# Patient Record
Sex: FEMALE | Race: OTHER | Employment: FULL TIME | URBAN - METROPOLITAN AREA
[De-identification: names, ages, dates, MRNs, and addresses within clinical notes are randomized per-mention and may not be internally consistent; named-entity substitution may affect disease eponyms.]

---

## 2017-04-01 ENCOUNTER — OFFICE VISIT (OUTPATIENT)
Dept: FAMILY MEDICINE CLINIC | Facility: CLINIC | Age: 22
End: 2017-04-01

## 2017-04-01 VITALS
OXYGEN SATURATION: 99 % | HEART RATE: 90 BPM | TEMPERATURE: 98 F | DIASTOLIC BLOOD PRESSURE: 74 MMHG | SYSTOLIC BLOOD PRESSURE: 116 MMHG | RESPIRATION RATE: 16 BRPM

## 2017-04-01 DIAGNOSIS — R21 RASH: Primary | ICD-10-CM

## 2017-04-01 PROCEDURE — 99212 OFFICE O/P EST SF 10 MIN: CPT | Performed by: NURSE PRACTITIONER

## 2017-04-01 RX ORDER — PREDNISONE 50 MG/1
TABLET ORAL
Refills: 0 | COMMUNITY
Start: 2017-03-20 | End: 2017-04-01 | Stop reason: ALTCHOICE

## 2017-04-01 RX ORDER — METRONIDAZOLE 500 MG/1
TABLET ORAL
Refills: 0 | COMMUNITY
Start: 2017-02-18 | End: 2017-04-01 | Stop reason: ALTCHOICE

## 2017-04-01 NOTE — PATIENT INSTRUCTIONS
Use topical cream twice daily for next 1-2 weeks and monitor for improvement  You may also take Zyrtec as needed for the itch  Contact your PCP to arrange for follow-up    Pityriasis Rosea  This is a harmless non-contagious rash. The exact cause is unknown © 2360-8865 [address], [address]. All rights reserved. This information is not intended as a substitute for professional medical care. Always follow your healthcare professional's instructions.

## 2017-04-01 NOTE — PROGRESS NOTES
CHIEF COMPLAINT:   Patient presents with:  Rash  Headache     HPI:   Jazmine Schultz is a 22year old female who presents for evaluation of a pruritic rash to her trunk and arms. Began 1 week ago. Has gradually spread since onset.   Patient not had si HEENT: Denies rhinorrhea, edema of the lips or swelling of throat. CARDIOVASCULAR: Denies chest pains or palpitations. LUNGS: Denies shortness of breath with exertion or rest. No cough or wheezing. LYMPH: Denies enlargement of the lymph nodes.   NEURO: + - triamcinolone acetonide 0.1 % External Cream; Apply topically 2 (two) times daily. Dispense: 45 g; Refill: 0    Pt instructed to f/u with PCP for further management if not improving or worsening and for further management of her headaches.     The sarah · Topical steroids are sometimes used. Follow-up care  Follow up with your healthcare provider, or as advised. Call your provider if the itching is not controlled by the above suggestions, or if the rash lasts longer than 3 months.   When to seek medical a

## (undated) NOTE — MR AVS SNAPSHOT
[id number] [facility] [address]  [place] [id number] [phone]               Thank you for choosing us for your health care visit with STEPHANIE Stapleton.   We are glad to serve you and happy to provide you with this summary of your vis · Antihistamines may help with itching, but they can make you sleepy. · Topical steroids are sometimes used. Follow-up care  Follow up with your healthcare provider, or as advised.  Call your provider if the itching is not controlled by the above suggesti Now link in the [employer]. Enter your [email or web address] Activation Code exactly as it appears below along with your Zip Code and Date of Birth to complete the sign-up process. If you do not sign up before the expiration date, you must request a new code.     Cesilia Nicolas priority on exercise in your life                    Visit [facility] online at  [email or web address]